# Patient Record
Sex: FEMALE | Race: WHITE | NOT HISPANIC OR LATINO
[De-identification: names, ages, dates, MRNs, and addresses within clinical notes are randomized per-mention and may not be internally consistent; named-entity substitution may affect disease eponyms.]

---

## 2023-06-07 ENCOUNTER — APPOINTMENT (OUTPATIENT)
Dept: ORTHOPEDIC SURGERY | Facility: CLINIC | Age: 58
End: 2023-06-07
Payer: COMMERCIAL

## 2023-06-07 VITALS — WEIGHT: 155 LBS | BODY MASS INDEX: 25.83 KG/M2 | HEIGHT: 65 IN

## 2023-06-07 DIAGNOSIS — Z78.9 OTHER SPECIFIED HEALTH STATUS: ICD-10-CM

## 2023-06-07 DIAGNOSIS — M19.90 UNSPECIFIED OSTEOARTHRITIS, UNSPECIFIED SITE: ICD-10-CM

## 2023-06-07 PROCEDURE — 73564 X-RAY EXAM KNEE 4 OR MORE: CPT | Mod: RT

## 2023-06-07 PROCEDURE — 99214 OFFICE O/P EST MOD 30 MIN: CPT

## 2023-06-07 NOTE — HISTORY OF PRESENT ILLNESS
[Sudden] : sudden [7] : 7 [Dull/Aching] : dull/aching [Sleep] : sleep [de-identified] : 57 sameera old female with pain in the right knee, she injured her knee originally about 6 years ago, a kid slid into her leg and she twisted it. She injured her knee and foot at the time, the foot required more attention and the knee remained intermittently painful. She recently tripped over a plastic bag and twisted her knee, symptoms increased. Pain walking, stairs, finding a comfortable position at night.  [FreeTextEntry1] : right knee [FreeTextEntry5] : Patient injured the knee 6 years ago when a child slide into her leg. Patient reinjured the knee when tripping over an object.

## 2023-06-07 NOTE — DISCUSSION/SUMMARY
[de-identified] : modify activities\par use elastic sleeve\par try OTC meds\par ice as needed\par \par RE:  Dina Ward \par \par Acct #- [id number] \par \par Attention:  Nurse Reviewer /Medical Director\par \par  \par Based on my patient's condition, I strongly believe that the MRI R knee is medically.necessary.  \par The patient has failed oral meds, injections and PT and conservative treatment in combination or by themselves and therefore needs the MRI.  \par The MRI will dictate further treatment t recommendations.\par \par

## 2023-06-07 NOTE — PHYSICAL EXAM
[5___] : hamstring 5[unfilled]/5 [Positive] : positive Nubia [Right] : right knee [Degenerative change] : Degenerative change [] : no calf tenderness [TWNoteComboBox7] : flexion 125 degrees [de-identified] : extension 0 degrees

## 2023-06-13 ENCOUNTER — FORM ENCOUNTER (OUTPATIENT)
Age: 58
End: 2023-06-13

## 2023-06-14 ENCOUNTER — APPOINTMENT (OUTPATIENT)
Dept: MRI IMAGING | Facility: CLINIC | Age: 58
End: 2023-06-14
Payer: COMMERCIAL

## 2023-06-14 PROCEDURE — 73721 MRI JNT OF LWR EXTRE W/O DYE: CPT | Mod: RT

## 2023-06-21 ENCOUNTER — APPOINTMENT (OUTPATIENT)
Dept: ORTHOPEDIC SURGERY | Facility: CLINIC | Age: 58
End: 2023-06-21
Payer: COMMERCIAL

## 2023-06-21 VITALS — WEIGHT: 155 LBS | HEIGHT: 65 IN | BODY MASS INDEX: 25.83 KG/M2

## 2023-06-21 DIAGNOSIS — M17.11 UNILATERAL PRIMARY OSTEOARTHRITIS, RIGHT KNEE: ICD-10-CM

## 2023-06-21 DIAGNOSIS — S83.281D OTHER TEAR OF LATERAL MENISCUS, CURRENT INJURY, RIGHT KNEE, SUBSEQUENT ENCOUNTER: ICD-10-CM

## 2023-06-21 DIAGNOSIS — S83.241D OTHER TEAR OF MEDIAL MENISCUS, CURRENT INJURY, RIGHT KNEE, SUBSEQUENT ENCOUNTER: ICD-10-CM

## 2023-06-21 PROCEDURE — 99214 OFFICE O/P EST MOD 30 MIN: CPT

## 2023-06-21 NOTE — HISTORY OF PRESENT ILLNESS
[5] : 5 [0] : 0 [Lying in bed] : lying in bed [de-identified] : pain in the right knee, she injured her knee originally about 6 years ago, a kid slid into her leg and she twisted it. She injured her knee and the knee remained intermittently painful. She recently tripped over a plastic bag and twisted her knee, symptoms increased. Pain walking, stairs, finding a comfortable position at night. She had mRI [FreeTextEntry5] : Here today for right knee mri results. Pain is slightly better. Has been icing/using OTC meds when needed [FreeTextEntry6] : swelling [de-identified] : ice, OTC meds

## 2023-06-21 NOTE — PHYSICAL EXAM
[Right] : right knee [5___] : hamstring 5[unfilled]/5 [Positive] : positive Nubia [] : patient ambulates without assistive device [TWNoteComboBox7] : flexion 125 degrees [de-identified] : extension 0 degrees

## 2023-06-21 NOTE — DISCUSSION/SUMMARY
[de-identified] : Patient allowed to gently start resuming activities. \par Discussed change to medication prescription and usage. \par Bracing options discussed with patient. \par Activity modification as needed\par Discussed poss future surgery, pt deciding\par poss AS R knee med and lat meniscectomy questions answered, no guarantees

## 2023-06-21 NOTE — DATA REVIEWED
[MRI] : MRI [Right] : of the right [Knee] : knee [Report was reviewed and noted in the chart] : The report was reviewed and noted in the chart [I reviewed the films/CD and agree] : I reviewed the films/CD and agree [FreeTextEntry1] : complex mmt, small lmt, mild degen changes

## 2024-01-22 ENCOUNTER — NON-APPOINTMENT (OUTPATIENT)
Age: 59
End: 2024-01-22

## 2024-02-07 ENCOUNTER — APPOINTMENT (OUTPATIENT)
Dept: INTERNAL MEDICINE | Facility: CLINIC | Age: 59
End: 2024-02-07
Payer: COMMERCIAL

## 2024-02-07 VITALS
SYSTOLIC BLOOD PRESSURE: 129 MMHG | DIASTOLIC BLOOD PRESSURE: 87 MMHG | HEART RATE: 74 BPM | BODY MASS INDEX: 26.66 KG/M2 | HEIGHT: 65 IN | OXYGEN SATURATION: 98 % | WEIGHT: 160 LBS

## 2024-02-07 DIAGNOSIS — Z82.49 FAMILY HISTORY OF ISCHEMIC HEART DISEASE AND OTHER DISEASES OF THE CIRCULATORY SYSTEM: ICD-10-CM

## 2024-02-07 DIAGNOSIS — Z12.11 ENCOUNTER FOR SCREENING FOR MALIGNANT NEOPLASM OF COLON: ICD-10-CM

## 2024-02-07 DIAGNOSIS — Z12.39 ENCOUNTER FOR OTHER SCREENING FOR MALIGNANT NEOPLASM OF BREAST: ICD-10-CM

## 2024-02-07 DIAGNOSIS — L30.9 DERMATITIS, UNSPECIFIED: ICD-10-CM

## 2024-02-07 DIAGNOSIS — R42 DIZZINESS AND GIDDINESS: ICD-10-CM

## 2024-02-07 DIAGNOSIS — Z80.3 FAMILY HISTORY OF MALIGNANT NEOPLASM OF BREAST: ICD-10-CM

## 2024-02-07 DIAGNOSIS — Z77.22 CONTACT WITH AND (SUSPECTED) EXPOSURE TO ENVIRONMENTAL TOBACCO SMOKE (ACUTE) (CHRONIC): ICD-10-CM

## 2024-02-07 DIAGNOSIS — Z00.00 ENCOUNTER FOR GENERAL ADULT MEDICAL EXAMINATION W/OUT ABNORMAL FINDINGS: ICD-10-CM

## 2024-02-07 DIAGNOSIS — E55.9 VITAMIN D DEFICIENCY, UNSPECIFIED: ICD-10-CM

## 2024-02-07 PROCEDURE — 99203 OFFICE O/P NEW LOW 30 MIN: CPT | Mod: 25

## 2024-02-07 PROCEDURE — 99386 PREV VISIT NEW AGE 40-64: CPT

## 2024-02-07 RX ORDER — DESLORATADINE 2.5 MG
TABLET,DISINTEGRATING ORAL
Refills: 0 | Status: ACTIVE | COMMUNITY

## 2024-02-07 NOTE — PHYSICAL EXAM
[Normal Gait] : normal gait [Normal] : affect was normal and insight and judgment were intact [de-identified] : multiple erythematous flat, round lesions generalized-- arms, legs, trunk. + dry skin

## 2024-02-07 NOTE — HISTORY OF PRESENT ILLNESS
[FreeTextEntry1] : annual [de-identified] : 59 yo F with OA presents for annual.  Saw cardiology recently for chest discomfort/heart flutter? 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 Very dry skin. Had reaction to cerave and vanicream. + dry skin No new lotions/soaps/detergent/etc.  diet regular active at work but not really exercising.  Dec 29, tripped up stairs, hit head on wall. 10 days later, then with dizziness. Room spinning once when she went to bed. Moving head feels off balance.   Has GYN, will be making appt for pap.

## 2024-02-08 LAB
25(OH)D3 SERPL-MCNC: 30.3 NG/ML
ALBUMIN SERPL ELPH-MCNC: 4.8 G/DL
ALP BLD-CCNC: 103 U/L
ALT SERPL-CCNC: 46 U/L
ANION GAP SERPL CALC-SCNC: 12 MMOL/L
AST SERPL-CCNC: 34 U/L
BASOPHILS # BLD AUTO: 0.07 K/UL
BASOPHILS NFR BLD AUTO: 1.4 %
BILIRUB SERPL-MCNC: 0.5 MG/DL
BUN SERPL-MCNC: 13 MG/DL
CALCIUM SERPL-MCNC: 9.6 MG/DL
CHLORIDE SERPL-SCNC: 103 MMOL/L
CHOLEST SERPL-MCNC: 271 MG/DL
CO2 SERPL-SCNC: 26 MMOL/L
CREAT SERPL-MCNC: 0.69 MG/DL
EGFR: 100 ML/MIN/1.73M2
EOSINOPHIL # BLD AUTO: 0.37 K/UL
EOSINOPHIL NFR BLD AUTO: 7.2 %
ESTIMATED AVERAGE GLUCOSE: 105 MG/DL
GLUCOSE SERPL-MCNC: 100 MG/DL
HBA1C MFR BLD HPLC: 5.3 %
HCT VFR BLD CALC: 43.6 %
HDLC SERPL-MCNC: 85 MG/DL
HGB BLD-MCNC: 14.6 G/DL
IMM GRANULOCYTES NFR BLD AUTO: 0.2 %
LDLC SERPL CALC-MCNC: 170 MG/DL
LYMPHOCYTES # BLD AUTO: 1.38 K/UL
LYMPHOCYTES NFR BLD AUTO: 26.9 %
MAN DIFF?: NORMAL
MCHC RBC-ENTMCNC: 31.9 PG
MCHC RBC-ENTMCNC: 33.5 GM/DL
MCV RBC AUTO: 95.2 FL
MONOCYTES # BLD AUTO: 0.39 K/UL
MONOCYTES NFR BLD AUTO: 7.6 %
NEUTROPHILS # BLD AUTO: 2.91 K/UL
NEUTROPHILS NFR BLD AUTO: 56.7 %
NONHDLC SERPL-MCNC: 186 MG/DL
PLATELET # BLD AUTO: 305 K/UL
POTASSIUM SERPL-SCNC: 4.3 MMOL/L
PROT SERPL-MCNC: 7.4 G/DL
RBC # BLD: 4.58 M/UL
RBC # FLD: 13.1 %
SODIUM SERPL-SCNC: 141 MMOL/L
TRIGL SERPL-MCNC: 94 MG/DL
TSH SERPL-ACNC: 1.97 UIU/ML
VIT B12 SERPL-MCNC: 612 PG/ML
WBC # FLD AUTO: 5.13 K/UL

## 2024-03-08 ENCOUNTER — NON-APPOINTMENT (OUTPATIENT)
Age: 59
End: 2024-03-08

## 2024-10-23 ENCOUNTER — APPOINTMENT (OUTPATIENT)
Dept: INTERNAL MEDICINE | Facility: CLINIC | Age: 59
End: 2024-10-23
Payer: COMMERCIAL

## 2024-10-23 DIAGNOSIS — Z12.11 ENCOUNTER FOR SCREENING FOR MALIGNANT NEOPLASM OF COLON: ICD-10-CM

## 2024-10-23 DIAGNOSIS — Z00.00 ENCOUNTER FOR GENERAL ADULT MEDICAL EXAMINATION W/OUT ABNORMAL FINDINGS: ICD-10-CM

## 2024-10-23 PROCEDURE — 99441: CPT

## 2025-02-04 ENCOUNTER — NON-APPOINTMENT (OUTPATIENT)
Age: 60
End: 2025-02-04

## 2025-02-12 ENCOUNTER — APPOINTMENT (OUTPATIENT)
Dept: GASTROENTEROLOGY | Facility: CLINIC | Age: 60
End: 2025-02-12
Payer: COMMERCIAL

## 2025-02-12 ENCOUNTER — LABORATORY RESULT (OUTPATIENT)
Age: 60
End: 2025-02-12

## 2025-02-12 PROCEDURE — 45380 COLONOSCOPY AND BIOPSY: CPT
